# Patient Record
Sex: FEMALE | Race: WHITE
[De-identification: names, ages, dates, MRNs, and addresses within clinical notes are randomized per-mention and may not be internally consistent; named-entity substitution may affect disease eponyms.]

---

## 2021-12-29 ENCOUNTER — HOSPITAL ENCOUNTER (EMERGENCY)
Dept: HOSPITAL 47 - EC | Age: 31
LOS: 1 days | Discharge: HOME | End: 2021-12-30
Payer: COMMERCIAL

## 2021-12-29 VITALS — TEMPERATURE: 98.8 F

## 2021-12-29 DIAGNOSIS — R07.89: ICD-10-CM

## 2021-12-29 DIAGNOSIS — O99.412: Primary | ICD-10-CM

## 2021-12-29 DIAGNOSIS — Z3A.26: ICD-10-CM

## 2021-12-29 LAB
ALBUMIN SERPL-MCNC: 3.8 G/DL (ref 3.5–5)
ALP SERPL-CCNC: 98 U/L (ref 38–126)
ALT SERPL-CCNC: 12 U/L (ref 4–34)
ANION GAP SERPL CALC-SCNC: 12 MMOL/L
APTT BLD: 22.4 SEC (ref 22–30)
AST SERPL-CCNC: 17 U/L (ref 14–36)
BASOPHILS # BLD AUTO: 0 K/UL (ref 0–0.2)
BASOPHILS NFR BLD AUTO: 0 %
BUN SERPL-SCNC: 9 MG/DL (ref 7–17)
CALCIUM SPEC-MCNC: 9.9 MG/DL (ref 8.4–10.2)
CHLORIDE SERPL-SCNC: 103 MMOL/L (ref 98–107)
CO2 SERPL-SCNC: 22 MMOL/L (ref 22–30)
EOSINOPHIL # BLD AUTO: 0.1 K/UL (ref 0–0.7)
EOSINOPHIL NFR BLD AUTO: 1 %
ERYTHROCYTE [DISTWIDTH] IN BLOOD BY AUTOMATED COUNT: 3.76 M/UL (ref 3.8–5.4)
ERYTHROCYTE [DISTWIDTH] IN BLOOD: 12.4 % (ref 11.5–15.5)
GLUCOSE SERPL-MCNC: 86 MG/DL (ref 74–99)
HCT VFR BLD AUTO: 34.6 % (ref 34–46)
HGB BLD-MCNC: 11.6 GM/DL (ref 11.4–16)
INR PPP: 0.9 (ref ?–1.2)
LYMPHOCYTES # SPEC AUTO: 2.3 K/UL (ref 1–4.8)
LYMPHOCYTES NFR SPEC AUTO: 18 %
MAGNESIUM SPEC-SCNC: 1.8 MG/DL (ref 1.6–2.3)
MCH RBC QN AUTO: 30.8 PG (ref 25–35)
MCHC RBC AUTO-ENTMCNC: 33.6 G/DL (ref 31–37)
MCV RBC AUTO: 91.8 FL (ref 80–100)
MONOCYTES # BLD AUTO: 0.7 K/UL (ref 0–1)
MONOCYTES NFR BLD AUTO: 5 %
NEUTROPHILS # BLD AUTO: 9.6 K/UL (ref 1.3–7.7)
NEUTROPHILS NFR BLD AUTO: 75 %
PH UR: 7.5 [PH] (ref 5–8)
PLATELET # BLD AUTO: 316 K/UL (ref 150–450)
POTASSIUM SERPL-SCNC: 3.9 MMOL/L (ref 3.5–5.1)
PROT SERPL-MCNC: 7 G/DL (ref 6.3–8.2)
PT BLD: 9.9 SEC (ref 9–12)
RBC UR QL: 1 /HPF (ref 0–5)
SODIUM SERPL-SCNC: 137 MMOL/L (ref 137–145)
SP GR UR: 1.01 (ref 1–1.03)
SQUAMOUS UR QL AUTO: 13 /HPF (ref 0–4)
UROBILINOGEN UR QL STRIP: <2 MG/DL (ref ?–2)
WBC # BLD AUTO: 12.8 K/UL (ref 3.8–10.6)
WBC #/AREA URNS HPF: 1 /HPF (ref 0–5)

## 2021-12-29 PROCEDURE — 83735 ASSAY OF MAGNESIUM: CPT

## 2021-12-29 PROCEDURE — 36415 COLL VENOUS BLD VENIPUNCTURE: CPT

## 2021-12-29 PROCEDURE — 84484 ASSAY OF TROPONIN QUANT: CPT

## 2021-12-29 PROCEDURE — 93005 ELECTROCARDIOGRAM TRACING: CPT

## 2021-12-29 PROCEDURE — 85025 COMPLETE CBC W/AUTO DIFF WBC: CPT

## 2021-12-29 PROCEDURE — 80053 COMPREHEN METABOLIC PANEL: CPT

## 2021-12-29 PROCEDURE — 99285 EMERGENCY DEPT VISIT HI MDM: CPT

## 2021-12-29 PROCEDURE — 85730 THROMBOPLASTIN TIME PARTIAL: CPT

## 2021-12-29 PROCEDURE — 81001 URINALYSIS AUTO W/SCOPE: CPT

## 2021-12-29 PROCEDURE — 85610 PROTHROMBIN TIME: CPT

## 2021-12-29 PROCEDURE — 71045 X-RAY EXAM CHEST 1 VIEW: CPT

## 2021-12-29 NOTE — XR
EXAMINATION TYPE: XR chest 1V portable

 

DATE OF EXAM: 12/29/2021

 

COMPARISON: NONE

 

HISTORY: Chest pain

 

TECHNIQUE: Single view

 

FINDINGS: Heart and mediastinum are normal. Lungs are clear of infiltrate. There are chest leads. Cos
tophrenic angles are clear. There are no hilar masses.

 

IMPRESSION: No active cardiopulmonary disease.

## 2021-12-29 NOTE — ED
Chest Pain HPI





- General


Chief Complaint: Chest Pain


Stated Complaint: chest pain, SOB


Time Seen by Provider: 12/29/21 23:29


Source: patient, RN notes reviewed, old records reviewed


Mode of arrival: ambulatory


Limitations: no limitations





- History of Present Illness


Initial Comments: 





This is a 31-year-old female DF for evaluation chest pain today.  Chest pain or 

26 week pregnancy.  5 days of chest pain denies coronavirus symptoms no fevers. 

She has been immunized and boosted.  Patient denies leg pain or swelling 

symptoms of been episodic for 5 days just concern prior to drive home tomorrow. 

Otherwise no new complaints no prior history of blood clots.  And no other 

significant symptoms currently.


MD Complaint: chest pain


-: days(s) (5)


Onset: during rest


Pain Location: substernal


Pain Radiation: none


Severity: moderate


Severity scale (1-10): 4


Quality: aching


Consistency: intermittent


Improves With: nothing


Worsens With: nothing


Context: other (Patient is pregnant)


Anginal Symptoms: dyspnea


Other Symptoms: cough


Treatments Prior to Arrival: none





- Related Data


                                    Allergies











Allergy/AdvReac Type Severity Reaction Status Date / Time


 


No Known Allergies Allergy   Verified 12/29/21 19:21














Review of Systems


ROS Statement: 


Those systems with pertinent positive or pertinent negative responses have been 

documented in the HPI.





ROS Other: All systems not noted in ROS Statement are negative.





EKG Findings





- EKG Comments:


EKG Findings:: EKG is sinus rhythm 86  QRS 94 





Past Medical History


Past Medical History: No Reported History


History of Any Multi-Drug Resistant Organisms: None Reported


Past Surgical History: Cholecystectomy, Orthopedic Surgery


Past Psychological History: No Psychological Hx Reported


Smoking Status: Never smoker


Past Alcohol Use History: None Reported


Past Drug Use History: None Reported





General Exam


Limitations: no limitations


General appearance: alert, in no apparent distress


Head exam: Present: atraumatic, normocephalic, normal inspection


Eye exam: Present: normal appearance, PERRL, EOMI.  Absent: scleral icterus, 

conjunctival injection, periorbital swelling


ENT exam: Present: normal exam, mucous membranes moist


Neck exam: Present: normal inspection.  Absent: tenderness, meningismus, lym

phadenopathy


Respiratory exam: Present: normal lung sounds bilaterally.  Absent: respiratory 

distress, wheezes, rales, rhonchi, stridor


Cardiovascular Exam: Present: regular rate, normal rhythm, normal heart sounds. 

Absent: systolic murmur, diastolic murmur, rubs, gallop, clicks


GI/Abdominal exam: Present: soft, normal bowel sounds.  Absent: distended, 

tenderness, guarding, rebound, rigid


Extremities exam: Present: normal inspection, full ROM, normal capillary refill.

 Absent: tenderness, pedal edema, joint swelling, calf tenderness


Back exam: Present: normal inspection


Neurological exam: Present: alert, oriented X3, CN II-XII intact


Psychiatric exam: Present: normal affect, normal mood


Skin exam: Present: warm, dry, intact, normal color.  Absent: rash





Course


                                   Vital Signs











  12/29/21 12/30/21





  19:22 00:54


 


Temperature 98.8 F 


 


Pulse Rate 89 84


 


Respiratory 20 18





Rate  


 


Blood Pressure 131/84 118/71


 


O2 Sat by Pulse 100 99





Oximetry  














- Reevaluation(s)


Reevaluation #1: 





12/30/21 00:59


Medical record is reviewed


Reevaluation #2: 





12/30/21 00:59


Patient agrees to avoid ultrasound for blood clots CT for PE as well as 

coronavirus testing


Reevaluation #3: 





12/30/21 00:59


Patient informed results known questions are answered


Reevaluation #4: 





12/30/21 00:59


Patient currently without complaint





Chest Pain MDM





- MDM





31 female with chest pain but normal vital signs denying coronavirus testing, 

labwork otherwise normal EKG negative and patient can be discharged home





Disposition


Clinical Impression: 


 Atypical chest pain, Chest pain





Disposition: HOME SELF-CARE


Condition: Good


Instructions (If sedation given, give patient instructions):  Chest Pain (ED)


Is patient prescribed a controlled substance at d/c from ED?: No


Referrals: 


None,Stated [Primary Care Provider] - 1-2 days

## 2021-12-30 VITALS — DIASTOLIC BLOOD PRESSURE: 71 MMHG | SYSTOLIC BLOOD PRESSURE: 118 MMHG | HEART RATE: 84 BPM | RESPIRATION RATE: 18 BRPM
